# Patient Record
Sex: MALE | Race: WHITE
[De-identification: names, ages, dates, MRNs, and addresses within clinical notes are randomized per-mention and may not be internally consistent; named-entity substitution may affect disease eponyms.]

---

## 2019-04-27 NOTE — NUR
PT ATTEMPTED TO D/C AND BECOME PROFUSELY SWEATY AND STATED HIS ARM WAS HURTING. 
WOULD LIKE TO STAY IN ROOM TO RECOVER AFTER PROCEDURE.

## 2021-03-01 ENCOUNTER — HOSPITAL ENCOUNTER (EMERGENCY)
Dept: HOSPITAL 8 - ED | Age: 31
Discharge: HOME | End: 2021-03-01
Payer: MEDICAID

## 2021-03-01 VITALS — HEIGHT: 72 IN | WEIGHT: 189.38 LBS | BODY MASS INDEX: 25.65 KG/M2

## 2021-03-01 VITALS — SYSTOLIC BLOOD PRESSURE: 124 MMHG | DIASTOLIC BLOOD PRESSURE: 83 MMHG

## 2021-03-01 DIAGNOSIS — R06.02: Primary | ICD-10-CM

## 2021-03-01 DIAGNOSIS — Z20.822: ICD-10-CM

## 2021-03-01 PROCEDURE — U0003 INFECTIOUS AGENT DETECTION BY NUCLEIC ACID (DNA OR RNA); SEVERE ACUTE RESPIRATORY SYNDROME CORONAVIRUS 2 (SARS-COV-2) (CORONAVIRUS DISEASE [COVID-19]), AMPLIFIED PROBE TECHNIQUE, MAKING USE OF HIGH THROUGHPUT TECHNOLOGIES AS DESCRIBED BY CMS-2020-01-R: HCPCS

## 2021-03-01 PROCEDURE — 99283 EMERGENCY DEPT VISIT LOW MDM: CPT

## 2021-03-01 NOTE — NUR
RN assessment completed. Pt denies use of medications regularly at home for med 
rec. Waiting on MD exam and covid swab.